# Patient Record
Sex: MALE | Race: WHITE | NOT HISPANIC OR LATINO | Employment: FULL TIME | URBAN - NONMETROPOLITAN AREA
[De-identification: names, ages, dates, MRNs, and addresses within clinical notes are randomized per-mention and may not be internally consistent; named-entity substitution may affect disease eponyms.]

---

## 2017-08-14 ENCOUNTER — TRANSCRIBE ORDERS (OUTPATIENT)
Dept: CARDIOLOGY | Facility: CLINIC | Age: 62
End: 2017-08-14

## 2017-08-14 DIAGNOSIS — Z71.9 ENCOUNTER FOR CONSULTATION: Primary | ICD-10-CM

## 2017-08-24 ENCOUNTER — CONSULT (OUTPATIENT)
Dept: CARDIOLOGY | Facility: CLINIC | Age: 62
End: 2017-08-24

## 2017-08-24 VITALS
SYSTOLIC BLOOD PRESSURE: 120 MMHG | HEIGHT: 71 IN | BODY MASS INDEX: 27.02 KG/M2 | HEART RATE: 103 BPM | WEIGHT: 193 LBS | DIASTOLIC BLOOD PRESSURE: 82 MMHG

## 2017-08-24 DIAGNOSIS — R00.0 TACHYCARDIA: ICD-10-CM

## 2017-08-24 DIAGNOSIS — R53.83 OTHER FATIGUE: ICD-10-CM

## 2017-08-24 DIAGNOSIS — R94.31 ABNORMAL EKG: ICD-10-CM

## 2017-08-24 DIAGNOSIS — R42 DIZZINESS: ICD-10-CM

## 2017-08-24 DIAGNOSIS — E78.00 HYPERCHOLESTEREMIA: Primary | ICD-10-CM

## 2017-08-24 DIAGNOSIS — I25.6 SILENT MYOCARDIAL ISCHEMIA: ICD-10-CM

## 2017-08-24 PROCEDURE — 93000 ELECTROCARDIOGRAM COMPLETE: CPT | Performed by: INTERNAL MEDICINE

## 2017-08-24 PROCEDURE — 99244 OFF/OP CNSLTJ NEW/EST MOD 40: CPT | Performed by: INTERNAL MEDICINE

## 2017-08-24 RX ORDER — EZETIMIBE AND SIMVASTATIN 10; 20 MG/1; MG/1
1 TABLET ORAL NIGHTLY
COMMUNITY
End: 2017-08-24

## 2017-08-24 RX ORDER — GABAPENTIN 300 MG/1
300 CAPSULE ORAL DAILY
COMMUNITY

## 2017-08-24 RX ORDER — IBUPROFEN 800 MG/1
800 TABLET ORAL 2 TIMES DAILY
COMMUNITY

## 2017-08-24 RX ORDER — EZETIMIBE AND SIMVASTATIN 10; 20 MG/1; MG/1
1 TABLET ORAL NIGHTLY
COMMUNITY
Start: 2017-08-24

## 2017-08-24 RX ORDER — DIAZEPAM 5 MG/1
5 TABLET ORAL DAILY PRN
COMMUNITY

## 2017-09-22 ENCOUNTER — OUTSIDE FACILITY SERVICE (OUTPATIENT)
Dept: CARDIOLOGY | Facility: CLINIC | Age: 62
End: 2017-09-22

## 2017-09-22 PROCEDURE — 78452 HT MUSCLE IMAGE SPECT MULT: CPT | Performed by: INTERNAL MEDICINE

## 2017-09-22 PROCEDURE — 93306 TTE W/DOPPLER COMPLETE: CPT | Performed by: INTERNAL MEDICINE

## 2017-09-22 PROCEDURE — 93018 CV STRESS TEST I&R ONLY: CPT | Performed by: INTERNAL MEDICINE

## 2018-08-30 ENCOUNTER — OFFICE VISIT (OUTPATIENT)
Dept: CARDIOLOGY | Facility: CLINIC | Age: 63
End: 2018-08-30

## 2018-08-30 VITALS
BODY MASS INDEX: 26.32 KG/M2 | DIASTOLIC BLOOD PRESSURE: 74 MMHG | HEART RATE: 84 BPM | SYSTOLIC BLOOD PRESSURE: 134 MMHG | WEIGHT: 188 LBS | HEIGHT: 71 IN

## 2018-08-30 DIAGNOSIS — E88.81 METABOLIC SYNDROME: ICD-10-CM

## 2018-08-30 DIAGNOSIS — R42 VERTIGO: ICD-10-CM

## 2018-08-30 DIAGNOSIS — I34.0 MITRAL VALVE INSUFFICIENCY, UNSPECIFIED ETIOLOGY: ICD-10-CM

## 2018-08-30 DIAGNOSIS — R01.1 MURMUR, CARDIAC: ICD-10-CM

## 2018-08-30 DIAGNOSIS — E78.00 HYPERCHOLESTEREMIA: Primary | ICD-10-CM

## 2018-08-30 PROCEDURE — 99213 OFFICE O/P EST LOW 20 MIN: CPT | Performed by: INTERNAL MEDICINE

## 2018-08-30 RX ORDER — ONDANSETRON HYDROCHLORIDE 8 MG/1
TABLET, FILM COATED ORAL EVERY 8 HOURS PRN
COMMUNITY

## 2018-08-30 NOTE — PROGRESS NOTES
Chief Complaint   Patient presents with   • Follow-up     for cardiac management   • Med Refill     PCP writes refills and monitors labs, most recent labs are in chart.        CARDIAC COMPLAINTS  Cardiac Management        Subjective   Cj Bone is a 62 y.o. male came in today for his follow-up visit.  He has history of hypercholesterolemia who also has significant family history of ischemic heart disease.  Last year he underwent echocardiogram and a stress test and found to have mild increase in chronotropic response, normal LV functions and no significant ischemia.  He was treated with cholesterol lowering medication.  He also has been having problem in the form of vertigo for which she's been taking high dose of nonsteroidal anti-inflammatory medications.  He came today feeling better from cardiac standpoint.  He denies any palpitation.  It did have lab work recently and found that the BUN as gone up to 35.  His cholesterol was very well controlled at 115 with the LDL of 45.  He takes his medication every other day.              Cardiac History  Past Surgical History:   Procedure Laterality Date   • CARDIOVASCULAR STRESS TEST  09/26/2017    @ [facility]. 9 Min. 5 Secs. 10.1 METS. 107% THR. BP- 145/80. Negative   • ECHO - CONVERTED  09/22/2017    @ [facility]. EF 60%. RVSP- 39 mmHg.       Current Outpatient Prescriptions   Medication Sig Dispense Refill   • diazePAM (VALIUM) 5 MG tablet Take 5 mg by mouth Daily As Needed for Anxiety (for vertigo).     • ezetimibe-simvastatin (VYTORIN) 10-20 MG per tablet Take 1 tablet by mouth Every Night. Reduce it to 1/2 tab Qhs     • gabapentin (NEURONTIN) 300 MG capsule Take 300 mg by mouth Daily.     • ibuprofen (ADVIL,MOTRIN) 800 MG tablet Take 800 mg by mouth 2 (Two) Times a Day.     • ondansetron (ZOFRAN) 8 MG tablet Take  by mouth Every 8 (Eight) Hours As Needed for Nausea or Vomiting.       No current facility-administered medications for this visit.         Allergies  :  Amantadines       Past Medical History:   Diagnosis Date   • History of appendectomy    • History of ear surgery    • History of inguinal hernia repair    • History of knee surgery    • Hyperlipidemia    • Meniere disease    • Vestibular migraine        Social History     Social History   • Marital status:      Spouse name: N/A   • Number of children: N/A   • Years of education: N/A     Occupational History   • Not on file.     Social History Main Topics   • Smoking status: Former Smoker     Packs/day: 1.00     Years: 10.00     Quit date: 1982   • Smokeless tobacco: Never Used   • Alcohol use No   • Drug use: No   • Sexual activity: Not on file     Other Topics Concern   • Not on file     Social History Narrative   • No narrative on file       Family History   Problem Relation Age of Onset   • Heart attack Paternal Grandfather    • No Known Problems Daughter        Review of Systems   Constitution: Negative for decreased appetite and malaise/fatigue.   HENT: Positive for hearing loss. Negative for congestion and sore throat.    Eyes: Negative for blurred vision.   Cardiovascular: Negative for chest pain and palpitations.   Respiratory: Negative for shortness of breath and snoring.    Endocrine: Negative for cold intolerance and heat intolerance.   Hematologic/Lymphatic: Negative for adenopathy. Does not bruise/bleed easily.   Skin: Negative for itching, nail changes and skin cancer.   Musculoskeletal: Negative for arthritis and myalgias.   Gastrointestinal: Negative for abdominal pain, dysphagia and heartburn.   Genitourinary: Negative for bladder incontinence and frequency.   Neurological: Positive for vertigo. Negative for dizziness, light-headedness and seizures.   Psychiatric/Behavioral: Negative for altered mental status.   Allergic/Immunologic: Negative for environmental allergies and hives.       Diabetes- No  Thyroid- normal    Objective     /74   Pulse 84   Ht 179.1 cm  "(70.5\")   Wt 85.3 kg (188 lb)   BMI 26.59 kg/m²     Physical Exam   Constitutional: He is oriented to person, place, and time. He appears well-developed and well-nourished.   HENT:   Head: Normocephalic.   Nose: Nose normal.   Eyes: Pupils are equal, round, and reactive to light. EOM are normal.   Neck: Normal range of motion. Neck supple.   Cardiovascular: Normal rate, regular rhythm, S1 normal and S2 normal.    Murmur heard.  Pulmonary/Chest: Effort normal and breath sounds normal.   Abdominal: Soft. Bowel sounds are normal.   Musculoskeletal: Normal range of motion. He exhibits no edema.   Neurological: He is alert and oriented to person, place, and time.   Skin: Skin is warm and dry.   Psychiatric: He has a normal mood and affect.     Procedures            Assessment/Plan   Patient's Body mass index is 26.59 kg/m². BMI is above normal parameters. Recommendations include: nutrition counseling.     Cj was seen today for follow-up and med refill.    Diagnoses and all orders for this visit:    Hypercholesteremia    Vertigo    Murmur, cardiac    Mitral valve insufficiency, unspecified etiology    Metabolic syndrome       His heart rate is much better than what it was before.  His blood pressure is stable his BMI as gone down but still close to 26.  I explained to him about his lab work.  Since he is taking high dose of NSAID, should consider increasing his fluid intake.  Also advised him to have it checked periodically.  Overall his cardiac status appears stable.  I do like to see him back in 6 months to make sure his renal function has not changed.  He is advised to call our office, if he develops any new symptoms.                  Electronically signed by Cris Marks MD August 30, 2018 4:16 PM      "